# Patient Record
Sex: FEMALE | Race: WHITE | Employment: FULL TIME | URBAN - METROPOLITAN AREA
[De-identification: names, ages, dates, MRNs, and addresses within clinical notes are randomized per-mention and may not be internally consistent; named-entity substitution may affect disease eponyms.]

---

## 2017-04-17 ENCOUNTER — OFFICE VISIT (OUTPATIENT)
Dept: FAMILY MEDICINE CLINIC | Facility: CLINIC | Age: 62
End: 2017-04-17

## 2017-04-17 VITALS
HEART RATE: 63 BPM | TEMPERATURE: 98 F | HEIGHT: 70 IN | BODY MASS INDEX: 37.05 KG/M2 | WEIGHT: 258.81 LBS | DIASTOLIC BLOOD PRESSURE: 88 MMHG | SYSTOLIC BLOOD PRESSURE: 123 MMHG | RESPIRATION RATE: 18 BRPM | OXYGEN SATURATION: 98 %

## 2017-04-17 DIAGNOSIS — R73.01 IMPAIRED FASTING GLUCOSE: ICD-10-CM

## 2017-04-17 DIAGNOSIS — I10 ESSENTIAL HYPERTENSION: ICD-10-CM

## 2017-04-17 DIAGNOSIS — R60.0 MILD PERIPHERAL EDEMA: ICD-10-CM

## 2017-04-17 DIAGNOSIS — E03.9 ACQUIRED HYPOTHYROIDISM: Primary | ICD-10-CM

## 2017-04-17 PROCEDURE — 99214 OFFICE O/P EST MOD 30 MIN: CPT | Performed by: FAMILY MEDICINE

## 2017-04-17 RX ORDER — ALBUTEROL SULFATE 90 UG/1
AEROSOL, METERED RESPIRATORY (INHALATION)
COMMUNITY
Start: 2015-12-26 | End: 2018-02-05 | Stop reason: ALTCHOICE

## 2017-04-17 RX ORDER — LISINOPRIL 40 MG/1
40 TABLET ORAL
Qty: 90 TABLET | Refills: 1 | Status: SHIPPED | OUTPATIENT
Start: 2017-04-17 | End: 2017-08-14

## 2017-04-17 RX ORDER — HYDROCHLOROTHIAZIDE 12.5 MG/1
TABLET ORAL DAILY PRN
Qty: 40 TABLET | Refills: 1 | Status: SHIPPED | OUTPATIENT
Start: 2017-04-17 | End: 2019-05-09

## 2017-04-17 NOTE — PROGRESS NOTES
Jay Wise IS A 63year old female [address] [place] Patient presents with:  Medication Request: med check and refill       History of present illness:     Labs done at work were good A1c 6.1, other labs in normal range.     Started with walking which helps wit Disp:  Rfl:    hydrochlorothiazide 12.5 MG Oral Tab Take 1-2 tablets (12.5-25 mg total) by mouth daily as needed (swelling). Disp: 40 tablet Rfl: 1   lisinopril 40 MG Oral Tab Take 1 tablet (40 mg total) by mouth once daily.  Disp: 90 tablet Rfl: 1   Ipratr soda non caff. Hobby Hazards No    Sleep Concern No    Stress Concern No    Weight Concern Yes    Special Diet No    Comment: some decrease in carbs.      Exercise Yes    Comment: walk and minor yoga 4 x week     Social History Narrative    Pt has 2 chi

## 2017-04-17 NOTE — PATIENT INSTRUCTIONS
Ask Dr. Aruna Velazquez about whether anything can be done for hair thinning. Biotin I've seen recommended in past.     Continue regular exercise (walking). Check TSH to be sure thyroid is ok, then I can refill in the next day or two. Lisinopril refilled.     Rech

## 2017-04-18 DIAGNOSIS — E03.9 ACQUIRED HYPOTHYROIDISM: Primary | ICD-10-CM

## 2017-04-18 RX ORDER — LEVOTHYROXINE SODIUM 175 UG/1
175 TABLET ORAL
Qty: 90 TABLET | Refills: 0 | Status: SHIPPED | OUTPATIENT
Start: 2017-04-18 | End: 2017-08-14

## 2017-04-28 RX ORDER — LEVOTHYROXINE SODIUM 0.15 MG/1
TABLET ORAL
Qty: 90 TABLET | Refills: 1 | OUTPATIENT
Start: 2017-04-28

## 2017-04-28 NOTE — TELEPHONE ENCOUNTER
Future Appointments  Date Time Provider Priya Goldsmith   8/14/2017 1:30 PM Bisi Reid MD [facility] [address]     LOV 4/17    LAST LAB     Read by Demetrius Pringle at 4/18/2017  9:06 PM       TSH is higher, 4.66.  I'd recommend increase to 175 mcg myron

## 2017-07-26 LAB — TSH: 1.6 MIU/L (ref 0.4–4.5)

## 2017-08-14 ENCOUNTER — OFFICE VISIT (OUTPATIENT)
Dept: FAMILY MEDICINE CLINIC | Facility: CLINIC | Age: 62
End: 2017-08-14

## 2017-08-14 VITALS
DIASTOLIC BLOOD PRESSURE: 86 MMHG | SYSTOLIC BLOOD PRESSURE: 134 MMHG | OXYGEN SATURATION: 95 % | HEIGHT: 70.5 IN | RESPIRATION RATE: 18 BRPM | TEMPERATURE: 98 F | BODY MASS INDEX: 36.86 KG/M2 | WEIGHT: 260.38 LBS | HEART RATE: 78 BPM

## 2017-08-14 DIAGNOSIS — R73.03 PREDIABETES: ICD-10-CM

## 2017-08-14 DIAGNOSIS — R09.82 POSTNASAL DRIP: ICD-10-CM

## 2017-08-14 DIAGNOSIS — Z01.419 WELL WOMAN EXAM WITH ROUTINE GYNECOLOGICAL EXAM: Primary | ICD-10-CM

## 2017-08-14 DIAGNOSIS — I10 ESSENTIAL HYPERTENSION: ICD-10-CM

## 2017-08-14 DIAGNOSIS — Z12.11 SCREEN FOR COLON CANCER: ICD-10-CM

## 2017-08-14 DIAGNOSIS — R63.5 WEIGHT GAIN: ICD-10-CM

## 2017-08-14 DIAGNOSIS — E03.9 ACQUIRED HYPOTHYROIDISM: ICD-10-CM

## 2017-08-14 DIAGNOSIS — Z23 NEED FOR SHINGLES VACCINE: ICD-10-CM

## 2017-08-14 DIAGNOSIS — Z12.31 ENCOUNTER FOR SCREENING MAMMOGRAM FOR BREAST CANCER: ICD-10-CM

## 2017-08-14 DIAGNOSIS — IMO0001 OBESITY, CLASS II, BMI 35-39.9, WITH COMORBIDITY: ICD-10-CM

## 2017-08-14 PROCEDURE — 87624 HPV HI-RISK TYP POOLED RSLT: CPT | Performed by: FAMILY MEDICINE

## 2017-08-14 PROCEDURE — 99396 PREV VISIT EST AGE 40-64: CPT | Performed by: FAMILY MEDICINE

## 2017-08-14 PROCEDURE — 88175 CYTOPATH C/V AUTO FLUID REDO: CPT | Performed by: FAMILY MEDICINE

## 2017-08-14 PROCEDURE — 90471 IMMUNIZATION ADMIN: CPT | Performed by: FAMILY MEDICINE

## 2017-08-14 PROCEDURE — 90736 HZV VACCINE LIVE SUBQ: CPT | Performed by: FAMILY MEDICINE

## 2017-08-14 PROCEDURE — 99213 OFFICE O/P EST LOW 20 MIN: CPT | Performed by: FAMILY MEDICINE

## 2017-08-14 RX ORDER — IPRATROPIUM BROMIDE 42 UG/1
1 SPRAY, METERED NASAL 2 TIMES DAILY
Qty: 3 BOTTLE | Refills: 3 | Status: SHIPPED | OUTPATIENT
Start: 2017-08-14 | End: 2019-05-09

## 2017-08-14 RX ORDER — LEVOTHYROXINE SODIUM 175 UG/1
175 TABLET ORAL
Qty: 90 TABLET | Refills: 1 | Status: SHIPPED | OUTPATIENT
Start: 2017-08-14 | End: 2018-02-05

## 2017-08-14 RX ORDER — LISINOPRIL 40 MG/1
40 TABLET ORAL
Qty: 90 TABLET | Refills: 1 | Status: SHIPPED | OUTPATIENT
Start: 2017-08-14 | End: 2018-02-19

## 2017-08-14 NOTE — PATIENT INSTRUCTIONS
Zostavax (shingles vaccine). Given today. Mammogram recommended every 1-2 years after age 36. Stool test (from [facility]) for colon cancer screening. Pap recommended every 3 to 5 years, if HPV testing for the virus that causes cervical cancer is negative.  Pelv

## 2017-08-14 NOTE — PROGRESS NOTES
Jyoti Guevara is a 63year old female here for Patient presents with:  Physical: with pap       HPI:   Patient complains of here for well check. Aline Lopez regularly, increased amount. Some stretching exercises.      Daily salad, watches food intake, magalie Prescriptions:  Levothyroxine Sodium 175 MCG Oral Tab Take 1 tablet (175 mcg total) by mouth before breakfast. Disp: 90 tablet Rfl: 1   Ipratropium Bromide 0.06 % Nasal Solution 1 spray by Nasal route 2 (two) times daily.  Disp: 3 Bottle Rfl: 3   lisinopril HS 2015. REVIEW OF SYSTEMS:     Constitutional: see HPI. Eye: no problems. ENT: No earache or change in hearing. Saw ENT for postnasal drip, ipratropium nasal spray helps. Minimal current allergies,no sinus pain, nosebleed or sore throat.    Heme axillary adenopathy. LUNGS: clear to auscultation. CARDIO: Regular rate and rhythm without murmur S3 or S4.  GI: no distention, masses, organomegaly or tenderness. : Normal labia majora and minora, normal urethra.  Vagina with normal introitus, no dis Class II, BMI 35-39.9, with comorbidity  -     DIETITIAN EDUCATION INITIAL, DIET (INTERNAL)      Discussed weight with pt, will plan to refer to dietician. Patient Instructions   Zostavax (shingles vaccine). Given today.  Mammogram recommended every 1-2

## 2017-08-15 DIAGNOSIS — Z01.419 WELL WOMAN EXAM WITH ROUTINE GYNECOLOGICAL EXAM: Primary | ICD-10-CM

## 2017-08-15 LAB
LAST PAP RESULT: NORMAL
PAP HISTORY (OTHER THAN LAST PAP): NORMAL

## 2017-08-17 ENCOUNTER — TELEPHONE (OUTPATIENT)
Dept: FAMILY MEDICINE CLINIC | Facility: CLINIC | Age: 62
End: 2017-08-17

## 2017-08-17 LAB — HPV I/H RISK 1 DNA SPEC QL NAA+PROBE: NEGATIVE

## 2017-08-17 NOTE — TELEPHONE ENCOUNTER
Where stalin got her Shingles vaccine is  red size width of her arm. Not warm is raised no pain. Advised can use ice can also take OTC Benadryl to see if that helps. If not better or worse to call for an appointment.

## 2017-10-07 ENCOUNTER — OFFICE VISIT (OUTPATIENT)
Dept: FAMILY MEDICINE CLINIC | Facility: CLINIC | Age: 62
End: 2017-10-07

## 2017-10-07 VITALS
HEART RATE: 78 BPM | OXYGEN SATURATION: 96 % | SYSTOLIC BLOOD PRESSURE: 130 MMHG | RESPIRATION RATE: 15 BRPM | DIASTOLIC BLOOD PRESSURE: 84 MMHG | HEIGHT: 70.5 IN | TEMPERATURE: 98 F | WEIGHT: 250 LBS | BODY MASS INDEX: 35.39 KG/M2

## 2017-10-07 DIAGNOSIS — J01.00 ACUTE NON-RECURRENT MAXILLARY SINUSITIS: Primary | ICD-10-CM

## 2017-10-07 PROCEDURE — 99213 OFFICE O/P EST LOW 20 MIN: CPT | Performed by: NURSE PRACTITIONER

## 2017-10-07 RX ORDER — AMOXICILLIN AND CLAVULANATE POTASSIUM 875; 125 MG/1; MG/1
1 TABLET, FILM COATED ORAL 2 TIMES DAILY
Qty: 20 TABLET | Refills: 0 | Status: SHIPPED | OUTPATIENT
Start: 2017-10-07 | End: 2017-10-17

## 2017-10-07 NOTE — PATIENT INSTRUCTIONS
1. Rest. Drink plenty of fluids. 2. Augmentin as prescribed. 3. Tylenol/Ibuprofen for pain/fevers. 4. Nasal rinses as directed. Use humidifier at home when possible.   5. Follow up with PMD in 3-4 days for reeval. Follow up sooner or go to the emergenc · Over-the-counter decongestants may be used unless a similar medicine was prescribed. Nasal sprays work the fastest. Use one that contains phenylephrine or oxymetazoline. First blow the nose gently. Then use the spray.  Do not use these medicines more ofte © 6322-3066 [address], [address] [place]. All rights reserved. This information is not intended as a substitute for professional medical care. Always follow your healthcare professional's instructions.

## 2017-10-07 NOTE — PROGRESS NOTES
CHIEF COMPLAINT:   Patient presents with:  Sinus Problem: sinus pain, pressure, congestion      HPI:   Philip Tyson is a 63year old female who presents for cold symptoms for  2  weeks.  Symptoms have progressed into sinus congestion and been worsening s • Migraine, unspecified, without mention of intractable migraine without mention of status migrainosus    • Need for prophylactic vaccination with combined diphtheria-tetanus-pertussis (DTP) vaccine    • Other screening mammogram    • Other specified cardi GENERAL: well developed, well nourished,in no apparent distress  SKIN: no rashes,no suspicious lesions  HEAD: atraumatic, normocephalic, +  tenderness on palpation of maxillary sinuses  EYES: conjunctiva clear, EOM intact  EARS: TM's intact without erythem 5. Follow up with PMD in 3-4 days for reeval. Follow up sooner or go to the emergency department immediately if symptoms worsen, change, or if you have any concerns.       Sinusitis (Antibiotic Treatment)    The sinuses are air-filled spaces within the bone · Over-the-counter antihistamines may help if allergies contributed to your sinusitis.    · Do not use nasal rinses or irrigation during an acute sinus infection, unless told to by your health care provider.  Rinsing may spread the infection to other sinuse

## 2018-02-05 DIAGNOSIS — E03.9 ACQUIRED HYPOTHYROIDISM: ICD-10-CM

## 2018-02-05 RX ORDER — LEVOTHYROXINE SODIUM 175 UG/1
175 TABLET ORAL
Qty: 30 TABLET | Refills: 0 | Status: SHIPPED | OUTPATIENT
Start: 2018-02-05 | End: 2018-03-05

## 2018-02-05 NOTE — TELEPHONE ENCOUNTER
Future Appointments  Date Time Provider Priya Goldsmith   2/19/2018 1:00 PM Mary Betts MD [facility] [address]     Levothyroxine Sodium 175 MCG Oral Tab 90 tablet 1 8/14/2017     Refilled x 30 days. Next refill at appointment.

## 2018-02-08 ENCOUNTER — OFFICE VISIT (OUTPATIENT)
Dept: FAMILY MEDICINE CLINIC | Facility: CLINIC | Age: 63
End: 2018-02-08

## 2018-02-08 VITALS
HEART RATE: 100 BPM | OXYGEN SATURATION: 98 % | WEIGHT: 252 LBS | BODY MASS INDEX: 35.68 KG/M2 | TEMPERATURE: 98 F | HEIGHT: 70.5 IN | DIASTOLIC BLOOD PRESSURE: 74 MMHG | SYSTOLIC BLOOD PRESSURE: 128 MMHG | RESPIRATION RATE: 16 BRPM

## 2018-02-08 DIAGNOSIS — J02.9 ACUTE PHARYNGITIS, UNSPECIFIED ETIOLOGY: ICD-10-CM

## 2018-02-08 DIAGNOSIS — J01.00 ACUTE MAXILLARY SINUSITIS, RECURRENCE NOT SPECIFIED: Primary | ICD-10-CM

## 2018-02-08 DIAGNOSIS — R05.9 COUGH: ICD-10-CM

## 2018-02-08 DIAGNOSIS — R09.82 PND (POST-NASAL DRIP): ICD-10-CM

## 2018-02-08 PROCEDURE — 99213 OFFICE O/P EST LOW 20 MIN: CPT | Performed by: NURSE PRACTITIONER

## 2018-02-08 RX ORDER — BENZONATATE 200 MG/1
200 CAPSULE ORAL 3 TIMES DAILY PRN
Qty: 20 CAPSULE | Refills: 0 | Status: SHIPPED | OUTPATIENT
Start: 2018-02-08 | End: 2018-02-15

## 2018-02-08 RX ORDER — AMOXICILLIN AND CLAVULANATE POTASSIUM 875; 125 MG/1; MG/1
1 TABLET, FILM COATED ORAL 2 TIMES DAILY
Qty: 20 TABLET | Refills: 0 | Status: SHIPPED | OUTPATIENT
Start: 2018-02-08 | End: 2018-02-18

## 2018-02-08 RX ORDER — PREDNISONE 20 MG/1
40 TABLET ORAL DAILY
Qty: 6 TABLET | Refills: 0 | Status: SHIPPED | OUTPATIENT
Start: 2018-02-08 | End: 2018-02-11

## 2018-02-08 NOTE — PROGRESS NOTES
Nathan Giron is a 63year old female. Patient presents with:  Cough: sinus pressure,congestiton and swollen glads sx x 10 days.     HPI:    Symptoms started  10 days ago with purulent nasal discharge, maxillary sinus pressure, and headache, a lot of specified cardiac dysrhythmias(427.89)    • Plantar fascial fibromatosis    • Routine general medical examination at a health care facility    • Screening for diabetes mellitus    • Screening for gout    • Screening for lipoid disorders    • Screening for mouth 3 (three) times daily as needed. Imaging & Consults:  None        Increase fluids, rest, Tylenol or Ibuprofen prn, f/u in 5 days if not better.

## 2018-02-16 ENCOUNTER — TELEPHONE (OUTPATIENT)
Dept: FAMILY MEDICINE CLINIC | Facility: CLINIC | Age: 63
End: 2018-02-16

## 2018-02-16 DIAGNOSIS — E03.8 OTHER SPECIFIED HYPOTHYROIDISM: ICD-10-CM

## 2018-02-16 DIAGNOSIS — Z79.899 ENCOUNTER FOR LONG-TERM (CURRENT) USE OF MEDICATIONS: Primary | ICD-10-CM

## 2018-02-16 NOTE — TELEPHONE ENCOUNTER
Pt scheduled herself on line for med refills but has not completed labs ordered in August  Also noted in last lab notes  Viewed by Ermelinda Benavidez on 7/26/2017  9:19 AM   Written by Venita Gee MD on 7/26/2017  7:31 AM   Thyroid is normal. Better th

## 2018-02-19 ENCOUNTER — OFFICE VISIT (OUTPATIENT)
Dept: FAMILY MEDICINE CLINIC | Facility: CLINIC | Age: 63
End: 2018-02-19

## 2018-02-19 VITALS
RESPIRATION RATE: 17 BRPM | HEART RATE: 93 BPM | BODY MASS INDEX: 35.73 KG/M2 | OXYGEN SATURATION: 95 % | TEMPERATURE: 98 F | DIASTOLIC BLOOD PRESSURE: 78 MMHG | WEIGHT: 252.38 LBS | HEIGHT: 70.5 IN | SYSTOLIC BLOOD PRESSURE: 126 MMHG

## 2018-02-19 DIAGNOSIS — R73.01 IMPAIRED FASTING GLUCOSE: Primary | ICD-10-CM

## 2018-02-19 DIAGNOSIS — R60.0 MILD PERIPHERAL EDEMA: ICD-10-CM

## 2018-02-19 DIAGNOSIS — B35.4 TINEA CORPORIS: ICD-10-CM

## 2018-02-19 DIAGNOSIS — I10 ESSENTIAL HYPERTENSION: ICD-10-CM

## 2018-02-19 DIAGNOSIS — R09.82 POSTNASAL DRIP: ICD-10-CM

## 2018-02-19 PROCEDURE — 99214 OFFICE O/P EST MOD 30 MIN: CPT | Performed by: FAMILY MEDICINE

## 2018-02-19 RX ORDER — CLOTRIMAZOLE AND BETAMETHASONE DIPROPIONATE 10; .64 MG/G; MG/G
1 CREAM TOPICAL 2 TIMES DAILY
Qty: 15 G | Refills: 1 | Status: SHIPPED | OUTPATIENT
Start: 2018-02-19

## 2018-02-19 RX ORDER — AZELASTINE HCL 205.5 UG/1
1 SPRAY NASAL DAILY
Qty: 30 ML | Refills: 6 | Status: SHIPPED | OUTPATIENT
Start: 2018-02-19 | End: 2019-05-09

## 2018-02-19 RX ORDER — LISINOPRIL 40 MG/1
40 TABLET ORAL
Qty: 90 TABLET | Refills: 1 | Status: SHIPPED | OUTPATIENT
Start: 2018-02-19 | End: 2018-10-08

## 2018-02-19 NOTE — PROGRESS NOTES
Carmen Brown IS A 63year old female HERE FOR Patient presents with:  Thyroid Problem: Room 4. Med refills . Sinus   HTN       History of present illness:     Feels fine. Energy ok. Labs were done day after superbowl so gluc was 133.      Trying to lose Azelastine HCl 0.15 % Nasal Solution 1 spray by Nasal route daily. As needed for postnasal drip Disp: 30 mL Rfl: 6   clotrimazole-betamethasone 1-0.05 % External Cream Apply 1 Application topically 2 (two) times daily.  To spot on chest Disp: 15 g Rfl: 1 week     Social History Narrative    Pt has 2 children, grown daughter & son to be senior in [employer] 2015. Review of systems:   Takes HCTZ for swelling prn, but not on it during the rest of the year.    Spot under right breast/bra, x 3 months, slightly enl

## 2018-02-19 NOTE — PATIENT INSTRUCTIONS
For persistent postnasal drip after recent infection:  Try Nasacort AQ (triamcinolone) 2 sprays at bedtime daily for 1 week then 1 spray daily for a week  Then try to start azelastine nasal spray, 1 at bedtime each nostril.     We could switch back to iprat

## 2018-03-01 ENCOUNTER — TELEPHONE (OUTPATIENT)
Dept: FAMILY MEDICINE CLINIC | Facility: CLINIC | Age: 63
End: 2018-03-01

## 2018-03-01 NOTE — TELEPHONE ENCOUNTER
Lab ordered at [facility] in error    [facility] to add on TSH    Pt notified, understands we should still get results tomorrow

## 2018-03-01 NOTE — TELEPHONE ENCOUNTER
Pt was here on 2/18 -needed to get labs in order to get her thyroid medication. [facility] No [facility] ordered. I see in the computer 1 thryroid lab for the future. Pt needs her thyroid meds. We already gave her 1 month supply.  This is

## 2018-03-03 LAB
GLUCOSE: 139 MG/DL (ref 65–99)
HEMOGLOBIN A1C: 6.4 % OF TOTAL HGB
TSH: 0.1 MIU/L (ref 0.4–4.5)

## 2018-03-05 ENCOUNTER — PATIENT MESSAGE (OUTPATIENT)
Dept: FAMILY MEDICINE CLINIC | Facility: CLINIC | Age: 63
End: 2018-03-05

## 2018-03-05 DIAGNOSIS — E03.9 ACQUIRED HYPOTHYROIDISM: ICD-10-CM

## 2018-03-05 RX ORDER — LEVOTHYROXINE SODIUM 0.15 MG/1
TABLET ORAL
Qty: 45 TABLET | Refills: 0 | Status: SHIPPED | OUTPATIENT
Start: 2018-03-05 | End: 2018-04-30

## 2018-03-05 RX ORDER — LEVOTHYROXINE SODIUM 175 UG/1
TABLET ORAL
Qty: 45 TABLET | Refills: 0 | Status: SHIPPED | OUTPATIENT
Start: 2018-03-05 | End: 2018-06-19 | Stop reason: DRUGHIGH

## 2018-03-05 RX ORDER — LEVOTHYROXINE SODIUM 175 UG/1
TABLET ORAL
Qty: 30 TABLET | Refills: 0 | Status: CANCELLED | OUTPATIENT
Start: 2018-03-05

## 2018-03-05 NOTE — TELEPHONE ENCOUNTER
From: Reji Colvin  To: Aravind Wang MD  Sent: 3/5/2018 7:03 AM CST  Subject: Test Results Question    Your suggestion for the new dosing schedule for the thyroid sounds fine.  I only have about 4 days left of the 175mg so I will actually need pres

## 2018-03-05 NOTE — TELEPHONE ENCOUNTER
Viewed by John Krishnan on 3/5/2018  6:53 AM   Written by Dylan Bell MD on 3/4/2018 11:36 PM   A1c is in prediabetic range still. Fasting sugar 139. Thyroid oversupplemented. We should lower dose.  Looking back, it seems you have bounced between t

## 2018-03-26 DIAGNOSIS — I10 ESSENTIAL HYPERTENSION: ICD-10-CM

## 2018-03-26 RX ORDER — LISINOPRIL 40 MG/1
40 TABLET ORAL
Qty: 90 TABLET | Refills: 1 | OUTPATIENT
Start: 2018-03-26

## 2018-03-26 NOTE — TELEPHONE ENCOUNTER
LOV   Visit date not found    LAST LAB 2/17    LAST RX   lisinopril 40 MG Oral Tab 90 tablet 1 2/19/2018       PROTOCOL    Hypertension Medications Protocol Failed3/26 2:31 AM   CMP or BMP in past 12 months     Denied already done.

## 2018-04-29 ENCOUNTER — PATIENT MESSAGE (OUTPATIENT)
Dept: FAMILY MEDICINE CLINIC | Facility: CLINIC | Age: 63
End: 2018-04-29

## 2018-04-29 DIAGNOSIS — E03.9 ACQUIRED HYPOTHYROIDISM: ICD-10-CM

## 2018-04-30 RX ORDER — LEVOTHYROXINE SODIUM 0.15 MG/1
TABLET ORAL
Qty: 30 TABLET | Refills: 1 | Status: SHIPPED | OUTPATIENT
Start: 2018-04-30 | End: 2018-06-16

## 2018-04-30 NOTE — TELEPHONE ENCOUNTER
From: Ren Esqueda  To: Ana Lilia Smalls MD  Sent: 4/29/2018 1:33 PM CDT  Subject: Test Results Question    In response to your question, yes I do need a refill for the 150mcg levothyroxin as a result of my TSH lab work last Friday.  I use [facility] for

## 2018-04-30 NOTE — TELEPHONE ENCOUNTER
New rx sent to pharmacy, pt notified via My Chart    Patient Result Comments     Viewed by Shelton Nuñez on 4/29/2018  1:27 PM   Written by Joey Dean MD on 4/29/2018  8:25 AM   Thyroid is still a little oversupplemented.  Recommend decrease to

## 2018-06-16 DIAGNOSIS — E03.9 ACQUIRED HYPOTHYROIDISM: ICD-10-CM

## 2018-06-18 ENCOUNTER — PATIENT MESSAGE (OUTPATIENT)
Dept: FAMILY MEDICINE CLINIC | Facility: CLINIC | Age: 63
End: 2018-06-18

## 2018-06-18 NOTE — TELEPHONE ENCOUNTER
From: Riki Bolton  Sent: 6/16/2018 10:17 PM CDT  Subject: Medication Renewal Request    Iram Clemons would like a refill of the following medications:     Levothyroxine Sodium 150 MCG Oral Tab Brenna Salcido MD]   Patient Comment: Per test resu

## 2018-06-19 RX ORDER — LEVOTHYROXINE SODIUM 0.15 MG/1
TABLET ORAL
Qty: 90 TABLET | Refills: 1 | Status: SHIPPED
Start: 2018-06-19 | End: 2018-10-08

## 2018-06-19 NOTE — TELEPHONE ENCOUNTER
From: Nathan Giron  To: Todd Melendez MD  Sent: 6/18/2018 5:48 PM CDT  Subject: Test Results Question    In response to your question, no, I am only taking the 150 mg.  Dr Alisson Mccurdy changed my dosing schedule and requested the retest after 6 weeks [facility]

## 2018-06-19 NOTE — TELEPHONE ENCOUNTER
Pt sent My chart message that she is on just the 150 mcg now    Rx approved    Future Appointments  Date Time Provider Priya Connori   8/20/2018 1:00 PM Jarrod Jaramillo MD [facility] [address]

## 2018-10-08 ENCOUNTER — OFFICE VISIT (OUTPATIENT)
Dept: FAMILY MEDICINE CLINIC | Facility: CLINIC | Age: 63
End: 2018-10-08
Payer: COMMERCIAL

## 2018-10-08 VITALS
RESPIRATION RATE: 17 BRPM | DIASTOLIC BLOOD PRESSURE: 82 MMHG | WEIGHT: 256.38 LBS | SYSTOLIC BLOOD PRESSURE: 120 MMHG | HEART RATE: 90 BPM | OXYGEN SATURATION: 93 % | HEIGHT: 70.5 IN | BODY MASS INDEX: 36.29 KG/M2 | TEMPERATURE: 99 F

## 2018-10-08 DIAGNOSIS — I10 ESSENTIAL HYPERTENSION: ICD-10-CM

## 2018-10-08 DIAGNOSIS — Z23 NEED FOR VACCINATION: ICD-10-CM

## 2018-10-08 DIAGNOSIS — E03.9 ACQUIRED HYPOTHYROIDISM: ICD-10-CM

## 2018-10-08 PROCEDURE — 90686 IIV4 VACC NO PRSV 0.5 ML IM: CPT | Performed by: FAMILY MEDICINE

## 2018-10-08 PROCEDURE — 90471 IMMUNIZATION ADMIN: CPT | Performed by: FAMILY MEDICINE

## 2018-10-08 PROCEDURE — 99213 OFFICE O/P EST LOW 20 MIN: CPT | Performed by: FAMILY MEDICINE

## 2018-10-08 RX ORDER — LISINOPRIL 40 MG/1
40 TABLET ORAL
Qty: 90 TABLET | Refills: 1 | Status: SHIPPED | OUTPATIENT
Start: 2018-10-08 | End: 2019-04-15

## 2018-10-08 RX ORDER — LEVOTHYROXINE SODIUM 0.15 MG/1
TABLET ORAL
Qty: 90 TABLET | Refills: 1 | Status: SHIPPED | OUTPATIENT
Start: 2018-10-08 | End: 2019-06-01

## 2018-10-08 NOTE — PROGRESS NOTES
Kade Monterroso IS A 66year old female [address] [facility] Patient presents with:  Thyroid Problem: Medication refills       History of present illness:     No change in hair thinning, energy ok, no constipation. Temperature intolerance.  Tends to be hot more than co clotrimazole-betamethasone 1-0.05 % External Cream Apply 1 Application topically 2 (two) times daily. To spot on chest Disp: 15 g Rfl: 1   Ipratropium Bromide 0.06 % Nasal Solution 1 spray by Nasal route 2 (two) times daily.  Disp: 3 Bottle Rfl: 3   cetir Blood Transfusions: Not Asked        Caffeine Concern: Yes          Decaff.  Coffee 2 cups daily        Occupational Exposure: Not Asked        Hobby Hazards: No        Sleep Concern: No        Stress Concern: No        Weight Concern: Yes        Special

## 2019-04-15 DIAGNOSIS — E03.9 ACQUIRED HYPOTHYROIDISM: Primary | ICD-10-CM

## 2019-04-15 DIAGNOSIS — R73.01 IFG (IMPAIRED FASTING GLUCOSE): ICD-10-CM

## 2019-04-15 DIAGNOSIS — I10 ESSENTIAL HYPERTENSION: ICD-10-CM

## 2019-04-15 RX ORDER — LISINOPRIL 40 MG/1
40 TABLET ORAL
Qty: 90 TABLET | Refills: 1 | Status: SHIPPED | OUTPATIENT
Start: 2019-04-15 | End: 2019-05-09

## 2019-04-15 NOTE — TELEPHONE ENCOUNTER
lisinopril 40 MG Oral Tab         Sig: Take 1 tablet (40 mg total) by mouth once daily.     Disp:  90 tablet    Refills:  1    Start: 4/15/2019    Class: Normal    Non-formulary For: Essential hypertension    Last ordered: 6 months ago by Mani Hager,

## 2019-05-09 ENCOUNTER — OFFICE VISIT (OUTPATIENT)
Dept: FAMILY MEDICINE CLINIC | Facility: CLINIC | Age: 64
End: 2019-05-09
Payer: COMMERCIAL

## 2019-05-09 VITALS
TEMPERATURE: 98 F | RESPIRATION RATE: 17 BRPM | HEIGHT: 70 IN | WEIGHT: 259.5 LBS | HEART RATE: 83 BPM | DIASTOLIC BLOOD PRESSURE: 86 MMHG | SYSTOLIC BLOOD PRESSURE: 120 MMHG | BODY MASS INDEX: 37.15 KG/M2 | OXYGEN SATURATION: 94 %

## 2019-05-09 DIAGNOSIS — M25.572 ACUTE BILATERAL ANKLE PAIN: ICD-10-CM

## 2019-05-09 DIAGNOSIS — M25.571 ACUTE BILATERAL ANKLE PAIN: ICD-10-CM

## 2019-05-09 DIAGNOSIS — E03.9 ACQUIRED HYPOTHYROIDISM: ICD-10-CM

## 2019-05-09 DIAGNOSIS — I10 ESSENTIAL HYPERTENSION: Primary | ICD-10-CM

## 2019-05-09 DIAGNOSIS — Z12.31 ENCOUNTER FOR SCREENING MAMMOGRAM FOR BREAST CANCER: ICD-10-CM

## 2019-05-09 DIAGNOSIS — Z12.11 ENCOUNTER FOR COLORECTAL CANCER SCREENING: ICD-10-CM

## 2019-05-09 DIAGNOSIS — Z12.12 ENCOUNTER FOR COLORECTAL CANCER SCREENING: ICD-10-CM

## 2019-05-09 DIAGNOSIS — R60.0 MILD PERIPHERAL EDEMA: ICD-10-CM

## 2019-05-09 DIAGNOSIS — R73.01 IFG (IMPAIRED FASTING GLUCOSE): ICD-10-CM

## 2019-05-09 PROCEDURE — 99214 OFFICE O/P EST MOD 30 MIN: CPT | Performed by: FAMILY MEDICINE

## 2019-05-09 RX ORDER — LISINOPRIL 40 MG/1
40 TABLET ORAL
Qty: 90 TABLET | Refills: 1 | Status: SHIPPED | OUTPATIENT
Start: 2019-05-09 | End: 2019-09-24

## 2019-05-09 RX ORDER — HYDROCHLOROTHIAZIDE 12.5 MG/1
TABLET ORAL DAILY PRN
Qty: 40 TABLET | Refills: 1 | Status: SHIPPED | OUTPATIENT
Start: 2019-05-09 | End: 2019-06-17

## 2019-05-09 RX ORDER — LEVOTHYROXINE SODIUM 0.15 MG/1
TABLET ORAL
Qty: 90 TABLET | Refills: 1 | Status: CANCELLED | OUTPATIENT
Start: 2019-05-09

## 2019-05-09 NOTE — PATIENT INSTRUCTIONS
Try HCTZ to see if it helps some of the ankle swelling. Lisinopril refilled today. Could try Tylenol for ankle pain, occasional ibuprofen or naproxen/Aleve. Dr. Nguyen Godwin could evaluate for ankle.  Or we can check ankle x-rays first.    Eloy truong

## 2019-05-09 NOTE — PROGRESS NOTES
Angelina Son IS A 60year old female [address] [place] Patient presents with:  Medication Follow-Up: Med refill        History of present illness:     Occasionally checks BP. Energy is good. Lateral ankle swelling since walking more.  Ankles hurt some, dul topically 2 (two) times daily. To spot on chest Disp: 15 g Rfl: 1   cetirizine 10 MG Oral Tab Take 10 mg by mouth daily.  Disp:  Rfl:        Allergy:        Allergy History Not*        Comment:Propanolol; alopecia  Seasonal                    Family history file        Relationship status: Not on file      Intimate partner violence:        Fear of current or ex partner: Not on file        Emotionally abused: Not on file        Physically abused: Not on file        Forced sexual activity: Not on file    Other colorectal cancer screening    ordered cologuard      Patient Instructions   Try HCTZ to see if it helps some of the ankle swelling. Lisinopril refilled today. Could try Tylenol for ankle pain, occasional ibuprofen or naproxen/Aleve.  Dr. Katherine Gasca

## 2019-06-01 DIAGNOSIS — E03.9 ACQUIRED HYPOTHYROIDISM: ICD-10-CM

## 2019-06-03 RX ORDER — LEVOTHYROXINE SODIUM 0.15 MG/1
TABLET ORAL
Qty: 90 TABLET | Refills: 1 | Status: SHIPPED | OUTPATIENT
Start: 2019-06-03 | End: 2019-09-24

## 2019-06-03 NOTE — TELEPHONE ENCOUNTER
Levothyroxine Sodium 150 MCG Oral Tab         Sig: One tablet daily in the morning.     Disp:  90 tablet    Refills:  1    Start: 6/1/2019    Class: Normal    Non-formulary For: Acquired hypothyroidism    Last ordered: 7 months ago by Jude Skinner MD

## 2019-06-17 ENCOUNTER — OFFICE VISIT (OUTPATIENT)
Dept: FAMILY MEDICINE CLINIC | Facility: CLINIC | Age: 64
End: 2019-06-17
Payer: COMMERCIAL

## 2019-06-17 VITALS
RESPIRATION RATE: 17 BRPM | BODY MASS INDEX: 35.59 KG/M2 | SYSTOLIC BLOOD PRESSURE: 110 MMHG | TEMPERATURE: 99 F | HEART RATE: 89 BPM | OXYGEN SATURATION: 95 % | DIASTOLIC BLOOD PRESSURE: 76 MMHG | HEIGHT: 70.5 IN | WEIGHT: 251.38 LBS

## 2019-06-17 DIAGNOSIS — R60.0 MILD PERIPHERAL EDEMA: ICD-10-CM

## 2019-06-17 DIAGNOSIS — IMO0001 UNCONTROLLED TYPE 2 DIABETES MELLITUS WITHOUT COMPLICATION, WITHOUT LONG-TERM CURRENT USE OF INSULIN: Primary | ICD-10-CM

## 2019-06-17 PROCEDURE — 99213 OFFICE O/P EST LOW 20 MIN: CPT | Performed by: FAMILY MEDICINE

## 2019-06-17 RX ORDER — HYDROCHLOROTHIAZIDE 12.5 MG/1
12.5 TABLET ORAL DAILY
Qty: 90 TABLET | Refills: 1 | Status: SHIPPED | OUTPATIENT
Start: 2019-06-17 | End: 2019-09-24

## 2019-06-17 NOTE — PATIENT INSTRUCTIONS
We can refer to diabetes educator to teach you to check your sugars if you are interested. See ophthalmologist, referral to Dr. Esvin Ricardo. Recheck labs in 3 months, consider statin for cholesterol if  A1c still high and/or metformin.

## 2019-06-17 NOTE — PROGRESS NOTES
Shelton Nuñez IS A 60year old female [address] [facility] Patient presents with:  Lab Results: Go over results        History of present illness:     Started walking and trying to address weight in early May.  Usually eats better diet, but had a lot of junk at work morning. Disp: 90 tablet Rfl: 1   lisinopril 40 MG Oral Tab Take 1 tablet (40 mg total) by mouth once daily. Disp: 90 tablet Rfl: 1   clotrimazole-betamethasone 1-0.05 % External Cream Apply 1 Application topically 2 (two) times daily.  To spot on chest Dis file        Gets together: Not on file        Attends Voodoo service: Not on file        Active member of club or organization: Not on file        Attends meetings of clubs or organizations: Not on file        Relationship status: Not on file      Intim counseling & coordination of care. She wants to try better diet and exercise another few months to see if that can reverse her high A1c. She had been prediabetic previously. Discussed possible metformin and statin, will see how labs are in 3 months.  She a

## 2019-07-23 ENCOUNTER — TELEPHONE (OUTPATIENT)
Dept: FAMILY MEDICINE CLINIC | Facility: CLINIC | Age: 64
End: 2019-07-23

## 2019-09-17 LAB
ALBUMIN/GLOBULIN RATIO: 1.5 (CALC) (ref 1–2.5)
ALBUMIN: 4.3 G/DL (ref 3.6–5.1)
ALKALINE PHOSPHATASE: 52 U/L (ref 33–130)
ALT: 20 U/L (ref 6–29)
AST: 20 U/L (ref 10–35)
BILIRUBIN, TOTAL: 0.4 MG/DL (ref 0.2–1.2)
BUN: 14 MG/DL (ref 7–25)
CALCIUM: 9.3 MG/DL (ref 8.6–10.4)
CARBON DIOXIDE: 26 MMOL/L (ref 20–32)
CHLORIDE: 103 MMOL/L (ref 98–110)
CHOL/HDLC RATIO: 3.2 (CALC)
CHOLESTEROL, TOTAL: 169 MG/DL
CREATININE, RANDOM URINE: 215 MG/DL (ref 20–275)
CREATININE: 0.86 MG/DL (ref 0.5–0.99)
EGFR IF AFRICN AM: 84 ML/MIN/1.73M2
EGFR IF NONAFRICN AM: 72 ML/MIN/1.73M2
GLOBULIN: 2.9 G/DL (CALC) (ref 1.9–3.7)
GLUCOSE: 135 MG/DL (ref 65–99)
HDL CHOLESTEROL: 53 MG/DL
HEMOGLOBIN A1C: 6.4 % OF TOTAL HGB
LDL-CHOLESTEROL: 97 MG/DL (CALC)
MICROALBUMIN/CREATININE RATIO, RANDOM URINE: 21 MCG/MG CREAT
MICROALBUMIN: 4.6 MG/DL
NON-HDL CHOLESTEROL: 116 MG/DL (CALC)
POTASSIUM: 4.3 MMOL/L (ref 3.5–5.3)
PROTEIN, TOTAL: 7.2 G/DL (ref 6.1–8.1)
SODIUM: 136 MMOL/L (ref 135–146)
TRIGLYCERIDES: 96 MG/DL

## 2019-09-24 ENCOUNTER — OFFICE VISIT (OUTPATIENT)
Dept: FAMILY MEDICINE CLINIC | Facility: CLINIC | Age: 64
End: 2019-09-24
Payer: COMMERCIAL

## 2019-09-24 VITALS
OXYGEN SATURATION: 97 % | BODY MASS INDEX: 34.52 KG/M2 | HEART RATE: 88 BPM | TEMPERATURE: 97 F | DIASTOLIC BLOOD PRESSURE: 68 MMHG | SYSTOLIC BLOOD PRESSURE: 112 MMHG | WEIGHT: 243.81 LBS | HEIGHT: 70.5 IN | RESPIRATION RATE: 16 BRPM

## 2019-09-24 DIAGNOSIS — E11.9 CONTROLLED TYPE 2 DIABETES MELLITUS WITHOUT COMPLICATION, WITHOUT LONG-TERM CURRENT USE OF INSULIN (HCC): Primary | ICD-10-CM

## 2019-09-24 DIAGNOSIS — I10 ESSENTIAL HYPERTENSION: ICD-10-CM

## 2019-09-24 DIAGNOSIS — E03.9 ACQUIRED HYPOTHYROIDISM: ICD-10-CM

## 2019-09-24 DIAGNOSIS — Z23 NEED FOR VACCINATION: ICD-10-CM

## 2019-09-24 DIAGNOSIS — Z13.0 SCREENING FOR DEFICIENCY ANEMIA: ICD-10-CM

## 2019-09-24 PROCEDURE — 99214 OFFICE O/P EST MOD 30 MIN: CPT | Performed by: FAMILY MEDICINE

## 2019-09-24 PROCEDURE — 90686 IIV4 VACC NO PRSV 0.5 ML IM: CPT | Performed by: FAMILY MEDICINE

## 2019-09-24 PROCEDURE — 90471 IMMUNIZATION ADMIN: CPT | Performed by: FAMILY MEDICINE

## 2019-09-24 RX ORDER — LISINOPRIL 40 MG/1
40 TABLET ORAL
Qty: 90 TABLET | Refills: 1 | Status: SHIPPED | OUTPATIENT
Start: 2019-09-24 | End: 2020-03-19

## 2019-09-24 RX ORDER — SPIRONOLACTONE 25 MG/1
50 TABLET ORAL 2 TIMES DAILY
Refills: 6 | COMMUNITY
Start: 2019-09-03

## 2019-09-24 RX ORDER — LEVOTHYROXINE SODIUM 0.15 MG/1
TABLET ORAL
Qty: 90 TABLET | Refills: 1 | Status: SHIPPED | OUTPATIENT
Start: 2019-09-24 | End: 2020-05-07

## 2019-09-24 NOTE — PATIENT INSTRUCTIONS
Potassium is ok with spironolactone & lisinopril. Alma Robison doing great! Keep up same regimen, meds refilled, recheck in 6 months.      please get eye exam, referred to Dr. Michelle Dempsey

## 2019-09-24 NOTE — PROGRESS NOTES
Stacie Valle IS A 60year old female [address] [place] Patient presents with:  Lab Results: follow up with labs        History of present illness:     Exercising walking daily and changed diet, no swollen ankles now, lost 16#. Stopped HCTZ, BP better.      Saw de by mouth daily. Disp:  Rfl:    clotrimazole-betamethasone 1-0.05 % External Cream Apply 1 Application topically 2 (two) times daily.  To spot on chest Disp: 15 g Rfl: 1       Allergy:        Allergy History Not*        Comment:Propanolol; alopecia  Seasonal meetings of clubs or organizations: Not on file        Relationship status: Not on file      Intimate partner violence:        Fear of current or ex partner: Not on file        Emotionally abused: Not on file        Physically abused: Not on file        Fo 09/16/2019 103    • CARBON DIOXIDE 09/16/2019 26    • CALCIUM 09/16/2019 9.3    • PROTEIN, TOTAL 09/16/2019 7.2    • ALBUMIN 09/16/2019 4.3    • GLOBULIN 09/16/2019 2.9    • ALBUMIN/GLOBULIN RATIO 09/16/2019 1.5    • BILIRUBIN, TOTAL 09/16/2019 0.4    • AL

## 2020-03-18 DIAGNOSIS — I10 ESSENTIAL HYPERTENSION: ICD-10-CM

## 2020-03-18 NOTE — TELEPHONE ENCOUNTER
Patient said she has no refills left on medication  lisinopril 40 MG Oral Tab but in the system it shows 1 refill left ? ?     rescheduled appointment for may

## 2020-03-19 RX ORDER — LISINOPRIL 40 MG/1
40 TABLET ORAL
Qty: 90 TABLET | Refills: 1 | Status: SHIPPED | OUTPATIENT
Start: 2020-03-19 | End: 2020-05-07

## 2020-03-19 NOTE — TELEPHONE ENCOUNTER
LOV 9/24/2019    LAST LAB 9/16/2019    LAST RX   lisinopril 40 MG Oral Tab 90 tablet 1 9/24/2019    Sig:   Take 1 tablet (40 mg total) by mouth once daily.            Next OV   Future Appointments   Date Time Provider Priya Goldsmith   5/5/2020  4:00 PM P

## 2020-04-27 ENCOUNTER — TELEPHONE (OUTPATIENT)
Dept: FAMILY MEDICINE CLINIC | Facility: CLINIC | Age: 65
End: 2020-04-27

## 2020-05-07 ENCOUNTER — VIRTUAL PHONE E/M (OUTPATIENT)
Dept: FAMILY MEDICINE CLINIC | Facility: CLINIC | Age: 65
End: 2020-05-07
Payer: COMMERCIAL

## 2020-05-07 VITALS — SYSTOLIC BLOOD PRESSURE: 120 MMHG | DIASTOLIC BLOOD PRESSURE: 75 MMHG

## 2020-05-07 DIAGNOSIS — Z12.31 VISIT FOR SCREENING MAMMOGRAM: Primary | ICD-10-CM

## 2020-05-07 DIAGNOSIS — E03.9 ACQUIRED HYPOTHYROIDISM: ICD-10-CM

## 2020-05-07 DIAGNOSIS — I10 ESSENTIAL HYPERTENSION: ICD-10-CM

## 2020-05-07 DIAGNOSIS — L65.9 ALOPECIA: ICD-10-CM

## 2020-05-07 PROCEDURE — 99213 OFFICE O/P EST LOW 20 MIN: CPT | Performed by: FAMILY MEDICINE

## 2020-05-07 RX ORDER — LISINOPRIL 40 MG/1
40 TABLET ORAL
Qty: 90 TABLET | Refills: 1 | Status: SHIPPED | OUTPATIENT
Start: 2020-05-07

## 2020-05-07 RX ORDER — LEVOTHYROXINE SODIUM 0.15 MG/1
TABLET ORAL
Qty: 90 TABLET | Refills: 2 | Status: SHIPPED | OUTPATIENT
Start: 2020-05-07

## 2020-05-07 NOTE — PROGRESS NOTES
Mitchel Munoz IS A 63year old female evaluated via video visit FOR Patient presents with:  Hypertension: refill  Thyroid Problem   due to current national emergency with SARS-CoV-2 pandemic.      History of present illness:   2-way audio communication es Menopause     age 50   • Migraine, unspecified, without mention of intractable migraine without mention of status migrainosus    • Need for prophylactic vaccination with combined diphtheria-tetanus-pertussis (DTP) vaccine    • Other screening mammogram Other (hearing loss) Father        Social history:       Social History    Socioeconomic History      Marital status:       Spouse name: Not on file      Number of children: 2      Years of education: Not on file      Highest education level: Not on Exercise: Yes          walking  6 x week        Bike Helmet: Not Asked        Seat Belt: Not Asked        Self-Exams: Not Asked    Social History Narrative      Pt has 2 children, grown daughter & son to be senior in [place] [facility] 2015.       Review of systems:     Leotha Leyden to later June should be ok to start scheduling. Take care and I hope to see you in November!

## 2020-05-07 NOTE — PATIENT INSTRUCTIONS
Continue lisinopril 40 mg daily. 6 months rx given  Continue thyroid, rx given for 9 months until your next February work labs. Continue spironolactone, we can take over management of that rx for hair loss after Dr. Linda Hung.     Please upload or copy

## 2020-05-18 NOTE — TELEPHONE ENCOUNTER
Patient is concerned with spot where she got shingles vaccine on Monday. Transferred to triage line. Statement Selected

## 2020-12-08 ENCOUNTER — TELEPHONE (OUTPATIENT)
Dept: FAMILY MEDICINE CLINIC | Facility: CLINIC | Age: 65
End: 2020-12-08

## 2020-12-08 NOTE — TELEPHONE ENCOUNTER
Medical Record Request Received    Date received in office: 12/7/2020    Requested from: Irvin Rose    Records to be sent to: [facility]  P: [phone]  F: [phone]           Date request sent to [employer]: 12/8/2020

## 2021-01-26 ENCOUNTER — TELEPHONE (OUTPATIENT)
Dept: FAMILY MEDICINE CLINIC | Facility: CLINIC | Age: 66
End: 2021-01-26

## (undated) NOTE — LETTER
05/15/19        [address] [id number]      Dear Cele Becerra,    [facility] records indicate that you have outstanding lab work and or testing that was ordered for you and has not yet been completed:  Orders Placed This Encounter

## (undated) NOTE — MR AVS SNAPSHOT
[address] [place] [address] [id number] [phone]               Thank you for choosing us for your health care visit with Huber Tejada MD.  We are glad to serve you and happy to provide you with this Ipratropium Bromide 0.06 % Soln   USE ONE PUFF TWICE DAILY AS DIRECTED BY YOUR PHYSICIAN   Commonly known as:  ATROVENT           Levothyroxine Sodium 150 MCG Tabs   Take 1 tablet (150 mcg total) by mouth every morning before breakfast.   Commonly known a Eat plenty of protein, keep the fat content low Sugars:  sodas and sports drinks, candies and desserts   Eat plenty of low-fat dairy products High fat meats and dairy   Choose whole grain products Foods high in sodium   Water is best for hydration Fast pamella

## (undated) NOTE — LETTER
04/16/18        [address]  [address] [facility] [address]      Dear Hetal Estevez,    [facility] records indicate that you have outstanding lab work and or testing that was ordered for you and has not yet been completed:          TSH [899] [Q]    To p

## (undated) NOTE — LETTER
06/08/20        [address] [address]      Dear Antonia Klinefelter,    [address] [facility] records indicate that you have outstanding lab work and or testing that was ordered for you and has not yet been completed:  Orders Placed This Encounter

## (undated) NOTE — LETTER
08/24/20        [address] [id number]      Dear Janis Carmen,    [address] records indicate that you have outstanding lab work and or testing that was ordered for you and has not yet been completed:  Orders Placed This Encounter

## (undated) NOTE — LETTER
09/13/17        [address]  [address] [address]      Dear Thaddeus Opitz,    [address] records indicate that you have outstanding lab work and or testing that was ordered for you and has not yet been completed:          INSURE FECAL GLOBIN b